# Patient Record
Sex: MALE | Race: WHITE | NOT HISPANIC OR LATINO | Employment: FULL TIME | URBAN - METROPOLITAN AREA
[De-identification: names, ages, dates, MRNs, and addresses within clinical notes are randomized per-mention and may not be internally consistent; named-entity substitution may affect disease eponyms.]

---

## 2021-11-04 ENCOUNTER — TELEPHONE (OUTPATIENT)
Dept: CARDIOLOGY | Facility: MEDICAL CENTER | Age: 46
End: 2021-11-04

## 2021-11-04 NOTE — TELEPHONE ENCOUNTER
Spoke with pt who confirmed they are new patient to cardiology. Per pt has seen other cardiology outside of [facility]. Per pt has not has any recent hospitalizations outside of [facility]. Pt is sharita to see Anneliese CAZARES on 11/05/21 at 0930.   Appointment time, location and date confirmed with pt.    Request records from [facility] for most recent labs at P#[phone] [phone]    Confirmation received. Scanned to [employer]. Pending records.

## 2021-11-05 ENCOUNTER — TELEPHONE (OUTPATIENT)
Dept: CARDIOLOGY | Facility: MEDICAL CENTER | Age: 46
End: 2021-11-05

## 2021-11-05 ENCOUNTER — OFFICE VISIT (OUTPATIENT)
Dept: CARDIOLOGY | Facility: MEDICAL CENTER | Age: 46
End: 2021-11-05
Payer: COMMERCIAL

## 2021-11-05 VITALS
WEIGHT: 215.4 LBS | RESPIRATION RATE: 14 BRPM | OXYGEN SATURATION: 96 % | HEART RATE: 78 BPM | SYSTOLIC BLOOD PRESSURE: 168 MMHG | HEIGHT: 68 IN | BODY MASS INDEX: 32.64 KG/M2 | DIASTOLIC BLOOD PRESSURE: 110 MMHG

## 2021-11-05 DIAGNOSIS — I10 ESSENTIAL HYPERTENSION: ICD-10-CM

## 2021-11-05 DIAGNOSIS — Z82.49 FAMILY HISTORY OF ANEURYSM: ICD-10-CM

## 2021-11-05 PROBLEM — F51.01 PRIMARY INSOMNIA: Status: ACTIVE | Noted: 2018-11-29

## 2021-11-05 PROBLEM — R35.0 INCREASED URINARY FREQUENCY: Status: ACTIVE | Noted: 2021-02-12

## 2021-11-05 PROBLEM — E78.2 MIXED HYPERLIPIDEMIA: Status: ACTIVE | Noted: 2018-06-09

## 2021-11-05 PROBLEM — G47.30 SLEEP APNEA: Status: ACTIVE | Noted: 2021-08-05

## 2021-11-05 LAB — EKG IMPRESSION: NORMAL

## 2021-11-05 PROCEDURE — 93000 ELECTROCARDIOGRAM COMPLETE: CPT | Performed by: INTERNAL MEDICINE

## 2021-11-05 PROCEDURE — 99214 OFFICE O/P EST MOD 30 MIN: CPT | Performed by: NURSE PRACTITIONER

## 2021-11-05 RX ORDER — ZOLPIDEM TARTRATE 5 MG/1
TABLET ORAL
COMMUNITY
Start: 2021-11-04 | End: 2021-11-05

## 2021-11-05 RX ORDER — DILTIAZEM HYDROCHLORIDE 360 MG/1
CAPSULE, EXTENDED RELEASE ORAL
COMMUNITY
Start: 2021-10-30 | End: 2021-11-05

## 2021-11-05 RX ORDER — DILTIAZEM HYDROCHLORIDE 360 MG/1
360 CAPSULE, EXTENDED RELEASE ORAL DAILY
COMMUNITY
Start: 2021-08-05

## 2021-11-05 RX ORDER — ZOLPIDEM TARTRATE 5 MG/1
5 TABLET ORAL NIGHTLY PRN
COMMUNITY
Start: 2021-10-28

## 2021-11-05 RX ORDER — CARVEDILOL 6.25 MG/1
6.25 TABLET ORAL 2 TIMES DAILY WITH MEALS
Qty: 60 TABLET | Refills: 11 | Status: SHIPPED | OUTPATIENT
Start: 2021-11-05 | End: 2021-12-22 | Stop reason: SDUPTHER

## 2021-11-05 RX ORDER — ZOLPIDEM TARTRATE 5 MG/1
TABLET ORAL
COMMUNITY
Start: 2021-10-29 | End: 2021-11-05

## 2021-11-05 ASSESSMENT — ENCOUNTER SYMPTOMS
VOMITING: 0
HEADACHES: 0
COUGH: 0
HEMOPTYSIS: 0
CHILLS: 0
NAUSEA: 0
ORTHOPNEA: 0
FEVER: 0
PALPITATIONS: 0
DIZZINESS: 0
SPUTUM PRODUCTION: 0
SHORTNESS OF BREATH: 0
CLAUDICATION: 0
PND: 0
WHEEZING: 0

## 2021-11-05 NOTE — TELEPHONE ENCOUNTER
Per DB     Records to be requested from [facility].    Faxed over records request form to [phone]

## 2021-11-05 NOTE — PATIENT INSTRUCTIONS
-We now know that lack of exercise is as risky as smoking or having diabetes in terms of your heart health.  Try to perform a moderate intensity exercise which includes brisk walking, biking, swimming at least 150 minutes a week or 30 minutes for 5 days in the week. Start 10 minutes daily on your bike.    Checking Blood Pressure:  -Blood pressure cuff, spend in the $40-65, with good return policy  -It should be automatic, upper arm, measure your arm to get the correct size, probably adult Large  -Put the cuff in place, rest arm on table near height of your heart, sit quietly for 5 min, legs uncrossed, with back support, then take your blood pressure, write it down, keep a log  -Check no more than 1 time day, maybe 4-5 times per week, try different times of day.  -Can bring your cuff to at least one appointment where it can be calibrated to a manual cuff if you are concerned.  -Goal blood pressure is at least under 130/80, ideally under 120/80.  If you think your BP is overall too high, let us know in the office, we can adjust medications, can use [facility] or call the [place] office: [phone].     -Salt=sodium=sea salt, guidelines say stay under 2,500 mg daily but I ask for under 4,000 mg daily.

## 2021-11-05 NOTE — LETTER
[facility]   [address]  [place] [address]  Phone: [phone]  Fax: [phone]              Aishwarya Cadroza  1975    Encounter Date: 11/5/2021    CHAU Mora          PROGRESS NOTE:  Chief Complaint   Patient presents with   • Hypertension     NP Dx: Essential (primary) hypertension       Subjective     Aishwarya Cardoza is a 46 y.o. male who presents today     History reviewed. No pertinent past medical history.  History reviewed. No pertinent surgical history.  History reviewed. No pertinent family history.  Social History     Socioeconomic History   • Marital status:      Spouse name: Not on file   • Number of children: Not on file   • Years of education: Not on file   • Highest education level: Not on file   Occupational History   • Not on file   Tobacco Use   • Smoking status: Former Smoker   • Smokeless tobacco: Former User   Substance and Sexual Activity   • Alcohol use: Yes   • Drug use: Never   • Sexual activity: Not on file   Other Topics Concern   • Not on file   Social History Narrative   • Not on file     Social Determinants of Health     Financial Resource Strain:    • Difficulty of Paying Living Expenses:    Food Insecurity:    • Worried About Running Out of Food in the Last Year:    • Ran Out of Food in the Last Year:    Transportation Needs:    • Lack of Transportation (Medical):    • Lack of Transportation (Non-Medical):    Physical Activity:    • Days of Exercise per Week:    • Minutes of Exercise per Session:    Stress:    • Feeling of Stress :    Social Connections:    • Frequency of Communication with Friends and Family:    • Frequency of Social Gatherings with Friends and Family:    • Attends Zoroastrianism Services:    • Active Member of Clubs or Organizations:    • Attends Club or Organization Meetings:    • Marital Status:    Intimate Partner Violence:    • Fear of Current or Ex-Partner:    •  "Emotionally Abused:    • Physically Abused:    • Sexually Abused:      No Known Allergies  Outpatient Encounter Medications as of 11/5/2021   Medication Sig Dispense Refill   • DILTIAZem CD (CARDIZEM CD) 360 MG CAPSULE SR 24 HR Take 360 mg by mouth every day.     • zolpidem (AMBIEN) 5 MG Tab Take 5 mg by mouth.     • Omega-3 Fatty Acids (FISH OIL PO) Take  by mouth.     • Multiple Vitamin (MULTI-VITAMIN PO) Take  by mouth.     • Red Yeast Rice Extract (RED YEAST RICE PO) Take  by mouth.     • [DISCONTINUED] diltiazem CD (CARDIZEM CD) 360 MG ER capsule  (Patient not taking: Reported on 11/5/2021)     • [DISCONTINUED] zolpidem (AMBIEN) 5 MG Tab  (Patient not taking: Reported on 11/5/2021)     • [DISCONTINUED] DILTIAZEM HCL PO  (Patient not taking: Reported on 11/5/2021)     • [DISCONTINUED] zolpidem (AMBIEN) 5 MG Tab  (Patient not taking: Reported on 11/5/2021)       No facility-administered encounter medications on file as of 11/5/2021.     Review of Systems   Constitutional: Negative for chills and fever.   Respiratory: Negative for cough, hemoptysis, sputum production, shortness of breath and wheezing.    Cardiovascular: Negative for chest pain, palpitations, orthopnea, claudication, leg swelling and PND.   Gastrointestinal: Negative for nausea and vomiting.   Neurological: Negative for dizziness and headaches.   All other systems reviewed and are negative.             Objective     BP (!) 168/110 (BP Location: Left arm, Patient Position: Sitting, BP Cuff Size: Adult)   Pulse 78   Resp 14   Ht 1.727 m (5' 8\")   Wt 97.7 kg (215 lb 6.4 oz)   SpO2 96%   BMI 32.75 kg/m²     Physical Exam  Vitals and nursing note reviewed.   Constitutional:       Appearance: He is well-developed.   Neck:      Vascular: No JVD.   Cardiovascular:      Rate and Rhythm: Normal rate and regular rhythm.      Heart sounds: Normal heart sounds. No murmur heard.     Pulmonary:      Effort: Pulmonary effort is normal.      Breath sounds: " Normal breath sounds.   Abdominal:      Palpations: Abdomen is soft.   Musculoskeletal:      Cervical back: Neck supple.   Skin:     General: Skin is warm and dry.   Neurological:      Comments: CN II-XII grossly intact   Psychiatric:         Behavior: Behavior normal.         Thought Content: Thought content normal.         Judgment: Judgment normal.                Assessment & Plan     1. Essential hypertension  EKG       Medical Decision Making: Today's Assessment/Status/Plan:                              No Recipients

## 2021-12-22 RX ORDER — CARVEDILOL 12.5 MG/1
12.5 TABLET ORAL 2 TIMES DAILY WITH MEALS
Qty: 180 TABLET | Refills: 3 | Status: SHIPPED | OUTPATIENT
Start: 2021-12-22 | End: 2022-03-25 | Stop reason: SDUPTHER

## 2022-02-11 ENCOUNTER — HOSPITAL ENCOUNTER (OUTPATIENT)
Dept: CARDIOLOGY | Facility: MEDICAL CENTER | Age: 47
End: 2022-02-11
Attending: NURSE PRACTITIONER
Payer: COMMERCIAL

## 2022-02-11 DIAGNOSIS — I10 ESSENTIAL HYPERTENSION: ICD-10-CM

## 2022-02-11 LAB
LV EJECT FRACT  99904: 60
LV EJECT FRACT MOD 2C 99903: 61.46
LV EJECT FRACT MOD 4C 99902: 60.18
LV EJECT FRACT MOD BP 99901: 60.56

## 2022-02-11 PROCEDURE — 93306 TTE W/DOPPLER COMPLETE: CPT

## 2022-02-11 PROCEDURE — 93306 TTE W/DOPPLER COMPLETE: CPT | Mod: 26 | Performed by: INTERNAL MEDICINE

## 2022-03-25 ENCOUNTER — OFFICE VISIT (OUTPATIENT)
Dept: CARDIOLOGY | Facility: MEDICAL CENTER | Age: 47
End: 2022-03-25
Payer: COMMERCIAL

## 2022-03-25 VITALS
HEART RATE: 84 BPM | BODY MASS INDEX: 32.74 KG/M2 | WEIGHT: 216 LBS | SYSTOLIC BLOOD PRESSURE: 140 MMHG | DIASTOLIC BLOOD PRESSURE: 82 MMHG | OXYGEN SATURATION: 95 % | RESPIRATION RATE: 16 BRPM | HEIGHT: 68 IN

## 2022-03-25 DIAGNOSIS — Z82.49 FAMILY HISTORY OF ANEURYSM: ICD-10-CM

## 2022-03-25 DIAGNOSIS — I10 ESSENTIAL HYPERTENSION: ICD-10-CM

## 2022-03-25 PROCEDURE — 99214 OFFICE O/P EST MOD 30 MIN: CPT | Performed by: NURSE PRACTITIONER

## 2022-03-25 RX ORDER — LOSARTAN POTASSIUM 25 MG/1
25 TABLET ORAL DAILY
Qty: 30 TABLET | Refills: 11 | Status: SHIPPED | OUTPATIENT
Start: 2022-03-25

## 2022-03-25 RX ORDER — DILTIAZEM HYDROCHLORIDE 360 MG/1
CAPSULE, EXTENDED RELEASE ORAL
COMMUNITY
Start: 2022-01-22 | End: 2022-03-25

## 2022-03-25 RX ORDER — CARVEDILOL 25 MG/1
25 TABLET ORAL 2 TIMES DAILY WITH MEALS
Qty: 180 TABLET | Refills: 3 | Status: SHIPPED | OUTPATIENT
Start: 2022-03-25

## 2022-03-25 ASSESSMENT — ENCOUNTER SYMPTOMS
COUGH: 0
ORTHOPNEA: 0
HEMOPTYSIS: 0
FEVER: 0
SPUTUM PRODUCTION: 0
NAUSEA: 0
PALPITATIONS: 0
HEADACHES: 0
PND: 0
VOMITING: 0
WHEEZING: 0
CHILLS: 0
SHORTNESS OF BREATH: 0
DIZZINESS: 0
CLAUDICATION: 0

## 2022-03-25 NOTE — PROGRESS NOTES
Chief Complaint   Patient presents with   • Hypertension     F/V Dx: Essential hypertension   • Hyperlipidemia     F/V Dx: Mixed hypertension       Subjective     Aishwarya David Cardoza is a 46 y.o. male who presents today for history of his brother having a cardiac aneurysm (brother followed by Dr. Dow) and also for hypertension.      Since 11/5/2021 the patient has had echocardiogram which was normal with EF of 60% and no WMA.  The patient brings in an extensive list of blood pressures and initially when we increased his carvedilol to 25 mg twice daily blood pressures improved a great deal.  Over the last 2 months they have been creeping up into the 130-150 SBP.  He is compliant with carvedilol 25 mg twice daily and diltiazem  mg daily.  Patient denies chest pain, palpitations, orthopnea, and lower extremity edema.    11/5/2021: He is hypertensive today at 168/110.  He was rechecked at the end of our visit and was 163/103.  Patient reports he was told he had a murmur when he was a child.  I could not hear 1 with auscultation today.  Patient denies shortness of breath, lower extremity edema, PND, orthopnea.  Has occasional sharp pain in his chest which happens at rest and with exercise but is very infrequent it lasts for just moments.  Has been on a statin in the past but had urine retention and bladder problems.     ECG today shows sinus rhythm.     No excessive alcohol, 2 drinks per day  No excessive caffeine, 1 cup/day  No drug use  No exercise  Works as a ,   Back surgery in 2008    Past Medical History:   Diagnosis Date   • Hypertension      History reviewed. No pertinent surgical history.  Family History   Problem Relation Age of Onset   • Other Brother      Social History     Socioeconomic History   • Marital status:      Spouse name: Not on file   • Number of children: Not on file   • Years of education: Not on file   • Highest education level: Not on file   Occupational  "History   • Not on file   Tobacco Use   • Smoking status: Former Smoker   • Smokeless tobacco: Former User   Substance and Sexual Activity   • Alcohol use: Yes   • Drug use: Never   • Sexual activity: Not on file   Other Topics Concern   • Not on file   Social History Narrative   • Not on file     Social Determinants of Health     Financial Resource Strain: Not on file   Food Insecurity: Not on file   Transportation Needs: Not on file   Physical Activity: Not on file   Stress: Not on file   Social Connections: Not on file   Intimate Partner Violence: Not on file   Housing Stability: Not on file     No Known Allergies  Outpatient Encounter Medications as of 3/25/2022   Medication Sig Dispense Refill   • carvedilol (COREG) 12.5 MG Tab Take 1 Tablet by mouth 2 times a day with meals. 180 Tablet 3   • DILTIAZem CD (CARDIZEM CD) 360 MG CAPSULE SR 24 HR Take 360 mg by mouth every day.     • zolpidem (AMBIEN) 5 MG Tab Take 5 mg by mouth at bedtime as needed.     • Omega-3 Fatty Acids (FISH OIL PO) Take 1,000 mg by mouth every day.     • Multiple Vitamin (MULTI-VITAMIN PO) Take  by mouth every day.     • Red Yeast Rice Extract (RED YEAST RICE PO) Take 600 mg by mouth every day.     • [DISCONTINUED] diltiazem CD (CARDIZEM CD) 360 MG ER capsule        No facility-administered encounter medications on file as of 3/25/2022.     Review of Systems   Constitutional: Negative for chills and fever.   Respiratory: Negative for cough, hemoptysis, sputum production, shortness of breath and wheezing.    Cardiovascular: Negative for chest pain, palpitations, orthopnea, claudication, leg swelling and PND.   Gastrointestinal: Negative for nausea and vomiting.   Neurological: Negative for dizziness and headaches.   All other systems reviewed and are negative.             Objective     /82 (BP Location: Left arm, Patient Position: Sitting, BP Cuff Size: Adult)   Pulse 84   Resp 16   Ht 1.727 m (5' 8\")   Wt 98 kg (216 lb)   SpO2 " 95%   BMI 32.84 kg/m²     Physical Exam  Vitals and nursing note reviewed.   Constitutional:       Appearance: He is well-developed.   Neck:      Vascular: No JVD.   Cardiovascular:      Rate and Rhythm: Normal rate and regular rhythm.      Heart sounds: Normal heart sounds. No murmur heard.  Pulmonary:      Effort: Pulmonary effort is normal.      Breath sounds: Normal breath sounds.   Abdominal:      Palpations: Abdomen is soft.   Musculoskeletal:      Cervical back: Neck supple.   Skin:     General: Skin is warm and dry.   Neurological:      Comments: CN II-XII grossly intact   Psychiatric:         Behavior: Behavior normal.         Thought Content: Thought content normal.         Judgment: Judgment normal.            Echocardiogram 2/11/2022  CONCLUSIONS   No prior study is available for comparison.   Normal left ventricular systolic function.   No evidence of valvular abnormality based on Doppler evaluation.    Assessment & Plan     1. Essential hypertension     2. Family history of aneurysm         Medical Decision Making: Today's Assessment/Status/Plan:   Continue carvedilol 25 mg twice daily and diltiazem  mg daily  Start losartan 25 mg daily.  Follow-up in 3 months.  Once blood pressure is stable can start following up with primary care.

## 2023-03-08 NOTE — PROGRESS NOTES
Chief Complaint   Patient presents with   • Hypertension     NP Dx: Essential (primary) hypertension       Subjective     Aishwarya Cardoza is a 46 y.o. male who presents today for consultation for history of his brother having a cardiac aneurysm (brother followed by Dr. Dow) and also for hypertension.  He is hypertensive today at 168/110.  He was rechecked at the end of our visit and was 163/103.  Patient reports he was told he had a murmur when he was a child.  I could not hear 1 with auscultation today.  Patient denies shortness of breath, lower extremity edema, PND, orthopnea.  Has occasional sharp pain in his chest which happens at rest and with exercise but is very infrequent it lasts for just moments.  Has been on a statin in the past but had urine retention and bladder problems.    ECG today shows sinus rhythm.    No excessive alcohol, 2 drinks per day  No excessive caffeine, 1 cup/day  No drug use  No exercise  Works as a ,   Back surgery in 2008    Past Medical History:   Diagnosis Date   • Hypertension      History reviewed. No pertinent surgical history.  Family History   Problem Relation Age of Onset   • Other Brother      Social History     Socioeconomic History   • Marital status:      Spouse name: Not on file   • Number of children: Not on file   • Years of education: Not on file   • Highest education level: Not on file   Occupational History   • Not on file   Tobacco Use   • Smoking status: Former Smoker   • Smokeless tobacco: Former User   Substance and Sexual Activity   • Alcohol use: Yes   • Drug use: Never   • Sexual activity: Not on file   Other Topics Concern   • Not on file   Social History Narrative   • Not on file     Social Determinants of Health     Financial Resource Strain:    • Difficulty of Paying Living Expenses:    Food Insecurity:    • Worried About Running Out of Food in the Last Year:    • Ran Out of Food in the Last Year:    Transportation Needs:    •  Lack of Transportation (Medical):    • Lack of Transportation (Non-Medical):    Physical Activity:    • Days of Exercise per Week:    • Minutes of Exercise per Session:    Stress:    • Feeling of Stress :    Social Connections:    • Frequency of Communication with Friends and Family:    • Frequency of Social Gatherings with Friends and Family:    • Attends L[employer] Services:    • Active Member of Clubs or Organizations:    • Attends Club or Organization Meetings:    • Marital Status:    Intimate Partner Violence:    • Fear of Current or Ex-Partner:    • Emotionally Abused:    • Physically Abused:    • Sexually Abused:      No Known Allergies  Outpatient Encounter Medications as of 11/5/2021   Medication Sig Dispense Refill   • DILTIAZem CD (CARDIZEM CD) 360 MG CAPSULE SR 24 HR Take 360 mg by mouth every day.     • zolpidem (AMBIEN) 5 MG Tab Take 5 mg by mouth.     • Omega-3 Fatty Acids (FISH OIL PO) Take  by mouth.     • Multiple Vitamin (MULTI-VITAMIN PO) Take  by mouth.     • Red Yeast Rice Extract (RED YEAST RICE PO) Take  by mouth.     • carvedilol (COREG) 6.25 MG Tab Take 1 Tablet by mouth 2 times a day with meals. 60 Tablet 11   • [DISCONTINUED] diltiazem CD (CARDIZEM CD) 360 MG ER capsule  (Patient not taking: Reported on 11/5/2021)     • [DISCONTINUED] zolpidem (AMBIEN) 5 MG Tab  (Patient not taking: Reported on 11/5/2021)     • [DISCONTINUED] DILTIAZEM HCL PO  (Patient not taking: Reported on 11/5/2021)     • [DISCONTINUED] zolpidem (AMBIEN) 5 MG Tab  (Patient not taking: Reported on 11/5/2021)       No facility-administered encounter medications on file as of 11/5/2021.     Review of Systems   Constitutional: Negative for chills and fever.   Respiratory: Negative for cough, hemoptysis, sputum production, shortness of breath and wheezing.    Cardiovascular: Negative for chest pain, palpitations, orthopnea, claudication, leg swelling and PND.   Gastrointestinal: Negative for nausea and vomiting.  "  Neurological: Negative for dizziness and headaches.   All other systems reviewed and are negative.             Objective     BP (!) 168/110 (BP Location: Left arm, Patient Position: Sitting, BP Cuff Size: Adult)   Pulse 78   Resp 14   Ht 1.727 m (5' 8\")   Wt 97.7 kg (215 lb 6.4 oz)   SpO2 96%   BMI 32.75 kg/m²     Physical Exam  Vitals and nursing note reviewed.   Constitutional:       Appearance: He is well-developed.   Neck:      Vascular: No JVD.   Cardiovascular:      Rate and Rhythm: Normal rate and regular rhythm.      Heart sounds: Normal heart sounds. No murmur heard.     Pulmonary:      Effort: Pulmonary effort is normal.      Breath sounds: Normal breath sounds.   Abdominal:      Palpations: Abdomen is soft.   Musculoskeletal:      Cervical back: Neck supple.   Skin:     General: Skin is warm and dry.   Neurological:      Comments: CN II-XII grossly intact   Psychiatric:         Behavior: Behavior normal.         Thought Content: Thought content normal.         Judgment: Judgment normal.     ECG 11/5/2021, my personal interpretation sinus rhythm           Assessment & Plan     1. Essential hypertension  EKG    EC-ECHOCARDIOGRAM COMPLETE W/O CONT   2. Family history of aneurysm         Medical Decision Making: Today's Assessment/Status/Plan:   We will obtain echocardiogram to see if there is any evidence of an aneurysm, check valves, and check for wall motion abnormalities and like that he had murmur as a child, sharp pain at rest and exertion, and murmur as a child.     Start carvedilol 6.25 mg twice daily.  Discussed the importance of checking blood pressure 2 hours after taking medication in the a.m.  Will MyChart message me with blood pressures in approximately 2 weeks.  He knows he can increase carvedilol to 12.5 mg twice daily if blood pressure remains over 130/80.    Obtain recent lab values from [facility].    Follow-up with me in 3 months          " show yes

## 2024-03-28 ENCOUNTER — OFFICE VISIT (OUTPATIENT)
Dept: CARDIOLOGY | Facility: MEDICAL CENTER | Age: 49
End: 2024-03-28
Attending: NURSE PRACTITIONER
Payer: COMMERCIAL

## 2024-03-28 VITALS
SYSTOLIC BLOOD PRESSURE: 126 MMHG | RESPIRATION RATE: 14 BRPM | OXYGEN SATURATION: 97 % | WEIGHT: 233 LBS | HEIGHT: 68 IN | DIASTOLIC BLOOD PRESSURE: 72 MMHG | HEART RATE: 68 BPM | BODY MASS INDEX: 35.31 KG/M2

## 2024-03-28 DIAGNOSIS — E78.2 MIXED HYPERLIPIDEMIA: Primary | ICD-10-CM

## 2024-03-28 DIAGNOSIS — I10 ESSENTIAL HYPERTENSION: ICD-10-CM

## 2024-03-28 LAB — EKG IMPRESSION: NORMAL

## 2024-03-28 PROCEDURE — 99212 OFFICE O/P EST SF 10 MIN: CPT | Performed by: NURSE PRACTITIONER

## 2024-03-28 PROCEDURE — 93005 ELECTROCARDIOGRAM TRACING: CPT | Performed by: NURSE PRACTITIONER

## 2024-03-28 RX ORDER — ROSUVASTATIN CALCIUM 20 MG/1
20 TABLET, COATED ORAL EVERY EVENING
Qty: 90 TABLET | Refills: 3 | Status: SHIPPED | OUTPATIENT
Start: 2024-03-28

## 2024-03-29 ASSESSMENT — ENCOUNTER SYMPTOMS
PND: 0
DIZZINESS: 0
SENSORY CHANGE: 0
ORTHOPNEA: 0
NEUROLOGICAL NEGATIVE: 1
HALLUCINATIONS: 0
NAUSEA: 0
CONSTITUTIONAL NEGATIVE: 1
EYES NEGATIVE: 1
WHEEZING: 0
PALPITATIONS: 0
DEPRESSION: 0
RESPIRATORY NEGATIVE: 1
CLAUDICATION: 0
GASTROINTESTINAL NEGATIVE: 1
BRUISES/BLEEDS EASILY: 0
NERVOUS/ANXIOUS: 0
SHORTNESS OF BREATH: 0
MUSCULOSKELETAL NEGATIVE: 1

## 2024-03-29 NOTE — PROGRESS NOTES
Cardiology Follow up Note    DOS: 3/29/2024  [id number]  Aishwarya Cardoza    Chief complaint/Reason for consult: annual follow up    HPI: Pt is a 48 y.o. male who presents to the clinic today in follow up for cardiac care . Patient has a past medical history significant for but not limited to: family h/o aneurysm and heart disease, hypertension, mixed hyperlipidemia, sleep apnea. Patient doing well. Increase rosuvastatin to 20mg for better lipid management. Healthy diet, hydration and exercise for lifestyle modification. BP well controlled today. Repeat lipid panel in one month.       Past Medical History:   Diagnosis Date    Hypertension        History reviewed. No pertinent surgical history.    Social History     Socioeconomic History    Marital status:      Spouse name: Not on file    Number of children: Not on file    Years of education: Not on file    Highest education level: Not on file   Occupational History    Not on file   Tobacco Use    Smoking status: Former    Smokeless tobacco: Former   Substance and Sexual Activity    Alcohol use: Yes    Drug use: Never    Sexual activity: Not on file   Other Topics Concern    Not on file   Social History Narrative    Not on file     Social Determinants of Health     Financial Resource Strain: Not on file   Food Insecurity: Not on file   Transportation Needs: Not on file   Physical Activity: Not on file   Stress: Not on file   Social Connections: Not on file   Intimate Partner Violence: Not on file   Housing Stability: Not on file       Family History   Problem Relation Age of Onset    Other Brother        No Known Allergies    Current Outpatient Medications   Medication Sig Dispense Refill    rosuvastatin (CRESTOR) 20 MG Tab Take 1 Tablet by mouth every evening. 90 Tablet 3    carvedilol (COREG) 25 MG Tab Take 1 Tablet by mouth 2 times a day with meals. 180 Tablet 3    losartan (COZAAR) 25 MG Tab Take 1 Tablet by mouth every day. 30 Tablet 11    DILTIAZem CD  (CARDIZEM CD) 360 MG CAPSULE SR 24 HR Take 360 mg by mouth every day.      zolpidem (AMBIEN) 5 MG Tab Take 5 mg by mouth at bedtime as needed.      Omega-3 Fatty Acids (FISH OIL PO) Take 1,000 mg by mouth every day.      Multiple Vitamin (MULTI-VITAMIN PO) Take  by mouth every day.      Red Yeast Rice Extract (RED YEAST RICE PO) Take 600 mg by mouth every day.       No current facility-administered medications for this visit.       Vitals:    03/28/24 1532   BP: 126/72   Pulse: 68   Resp: 14   SpO2: 97%         Review of Systems   Constitutional: Negative.  Negative for malaise/fatigue.   HENT: Negative.     Eyes: Negative.    Respiratory: Negative.  Negative for shortness of breath and wheezing.    Cardiovascular:  Negative for chest pain, palpitations, orthopnea, claudication, leg swelling and PND.   Gastrointestinal: Negative.  Negative for nausea.   Genitourinary: Negative.    Musculoskeletal: Negative.    Skin: Negative.    Neurological: Negative.  Negative for dizziness and sensory change.   Endo/Heme/Allergies: Negative.  Does not bruise/bleed easily.   Psychiatric/Behavioral:  Negative for depression and hallucinations. The patient is not nervous/anxious.           EKG interpreted by me: Sinus jefferson    Physical Exam  Constitutional:       Appearance: Normal appearance.   HENT:      Head: Normocephalic.   Eyes:      Pupils: Pupils are equal, round, and reactive to light.   Neck:      Vascular: No JVD.   Cardiovascular:      Rate and Rhythm: Regular rhythm. Bradycardia present.      Pulses: Normal pulses.      Heart sounds: Normal heart sounds.   Pulmonary:      Effort: Pulmonary effort is normal.      Breath sounds: Normal breath sounds.   Abdominal:      General: Abdomen is flat.      Palpations: Abdomen is soft.   Musculoskeletal:      Cervical back: Normal range of motion.      Right lower leg: No edema.      Left lower leg: No edema.   Skin:     General: Skin is warm and dry.   Neurological:      Mental  "Status: He is alert and oriented to person, place, and time.   Psychiatric:         Mood and Affect: Mood normal.         Behavior: Behavior normal.          Data:  Lipids:   No results found for: \"CHOLSTRLTOT\", \"TRIGLYCERIDE\", \"HDL\", \"LDL\"     BMP:  No results found for: \"SODIUM\", \"POTASSIUM\", \"CHLORIDE\", \"CO2\", \"GLUCOSE\", \"BUN\", \"CREATININE\", \"CALCIUM\", \"ANION\"    GFR:  No results found for: \"IFAFRICA\", \"IFNOTAFR\"     TSH:   No results found for: \"TSHULTRASEN\"    MAGNESIUM:  No results found for: \"MAGNESIUM\"     THYROXINE (T4):   No results found for: \"FREEDIR\"     CBC: No results found for: \"WBC\", \"RBC\", \"HEMOGLOBIN\", \"HEMATOCRIT\", \"MCV\", \"MCH\", \"MCHC\", \"RDW\", \"PLATELETCT\", \"MPV\", \"NEUTSPOLYS\", \"LYMPHOCYTES\", \"MONOCYTES\", \"EOSINOPHILS\", \"BASOPHILS\", \"IMMGRAN\", \"NRBC\", \"NEUTS\", \"LYMPHS\", \"MONOS\", \"EOS\", \"BASO\", \"IMMGRANAB\", \"NRBCAB\"     CBC w/o DIFF  No results found for: \"WBC\", \"RBC\", \"HEMOGLOBIN\", \"MCV\", \"MCH\", \"MCHC\", \"RDW\", \"MPV\"    LIVER:  No results found for: \"ALKPHOSPHAT\", \"ASTSGOT\", \"ALTSGPT\", \"TBILIRUBIN\"    BNP:  No results found for: \"BNPBTYPENAT\"    PT/INR:  No results found for: \"PROTHROMBTM\", \"INR\"          Impression/Plan:  Essential hypertension   - BP well controlled   - goal less than 130/80   - continue carvedilol, losartan, diltiazem   - annual follow up     Mixed hyperlipidemia   - increase rosuvastatin to 20mg daily   - repeat lipid in one month   - goal LDL less than 100       Lifestyle Modification for better heart health care as well as beneficial for prevention of worsening cardiac disease. Lifestyle modification discussed includes diet and reduction of sodium. Patients should eat more of a Mediterranean diet and be very careful with how much processed and fast food they eat. Excessive sodium intake can result in fluid retention which can add additional strain to patients cardiac system. Weight loss can also help long term with cardiac health. For patients with BMI above 25kg/m2, studies " have shown a greater chance of progression of disease as well as recurrence after interventions. Smoking and vaping should be stopped. Moderation on caffeine and alcohol. Excessive alcohol or caffeine can result in reactions and antagonize the heart system. Patient that fit the matrix for sleep apnea should be referred for a formal study and should try to be compliant with treatment for sleep apnea. Stay hydrated and stay active. Studies have shown that staying physically active can help heart health. Exercise goals should be trying to maintain 120-200 minutes per week of exercise.      A total of 30 minutes of time was spent on day of encounter reviewing medical record, performing history and examination, counseling, ordering medication/test/consults, collaborating with referring service, and documentation.    Luke Fishman AGACNP-EP  Cardiac Electrophysiology

## 2024-03-30 NOTE — ASSESSMENT & PLAN NOTE
- BP well controlled   - goal less than 130/80   - continue carvedilol, losartan, diltiazem   - annual follow up

## 2024-05-09 LAB
CHOLEST SERPL-MCNC: 125 MG/DL
HDLC SERPL-MCNC: 39 MG/DL
LDLC SERPL CALC-MCNC: 63 MG/DL
TRIGL SERPL-MCNC: 132 MG/DL

## 2024-05-13 DIAGNOSIS — E78.2 MIXED HYPERLIPIDEMIA: ICD-10-CM

## 2024-09-26 ENCOUNTER — TELEPHONE (OUTPATIENT)
Dept: CARDIOLOGY | Facility: MEDICAL CENTER | Age: 49
End: 2024-09-26
Payer: COMMERCIAL

## 2024-09-26 NOTE — TELEPHONE ENCOUNTER
MT    Caller: [facility]    Topic/issue: Nafisa is calling to notify about Aishwarya Cardoza  going to the outpatient clinic for blood pressure monitoring. Patients heart rate is dropping to 30's and 40's while resting awake. Patient is not symptomatic, but becomes light headed when standing. EKG done at facility 9/26/24, results are being faxed over.    [facility] discontinued Carvedilol 25 MG and Increased Losartan to 50 MG    Facility is asking to contact the patient.    [facility] [phone]    Callback Number: [phone]     Thank You,  Morena CARBONE

## 2024-09-26 NOTE — TELEPHONE ENCOUNTER
Would need to be seen as the patient is on high dose carvedilol for BP and that would need to be adjusted down or stopped due to bradycardia that could cause corresponding hypertension

## 2024-09-26 NOTE — TELEPHONE ENCOUNTER
Phone Number Called: [phone]    Call outcome: Spoke to patient regarding message below.    Message: Called to discuss symptoms and concerns with patient.   Reporting watch is showing bradycardia. Sometimes light headed when standing up.    HR seems to be lower only when sitting down or relaxing.   [facility] [place] they discontinued coreg and increased losartan 50mg.   Reporting going back to see PCP on the 10th.     Discussed with patient I will reach out to MT for any further recommendations at this time. This RN contacted [facility] to request EKG be sent to out office as well.

## 2024-09-26 NOTE — TELEPHONE ENCOUNTER
S/w patient regarding f/u appt. Discussed MT recommendations of seeing patient in clinic for a follow up appt to assess jefferson episodes. Patient also follows up with PCP on the 10/10/24.   Patient scheduled with MT 10/24/24 @ 12:45pm.   Patient verbalized understanding.

## 2024-10-24 ENCOUNTER — NON-PROVIDER VISIT (OUTPATIENT)
Dept: CARDIOLOGY | Facility: MEDICAL CENTER | Age: 49
End: 2024-10-24
Attending: NURSE PRACTITIONER
Payer: COMMERCIAL

## 2024-10-24 VITALS
HEIGHT: 68 IN | RESPIRATION RATE: 14 BRPM | BODY MASS INDEX: 35.25 KG/M2 | WEIGHT: 232.6 LBS | DIASTOLIC BLOOD PRESSURE: 72 MMHG | SYSTOLIC BLOOD PRESSURE: 126 MMHG | HEART RATE: 83 BPM | OXYGEN SATURATION: 97 %

## 2024-10-24 DIAGNOSIS — E78.2 MIXED HYPERLIPIDEMIA: ICD-10-CM

## 2024-10-24 DIAGNOSIS — I49.3 PVC (PREMATURE VENTRICULAR CONTRACTION): ICD-10-CM

## 2024-10-24 DIAGNOSIS — I10 ESSENTIAL HYPERTENSION: ICD-10-CM

## 2024-10-24 LAB — EKG IMPRESSION: NORMAL

## 2024-10-24 PROCEDURE — 93005 ELECTROCARDIOGRAM TRACING: CPT | Performed by: NURSE PRACTITIONER

## 2024-10-24 PROCEDURE — 99212 OFFICE O/P EST SF 10 MIN: CPT | Performed by: NURSE PRACTITIONER

## 2024-10-24 PROCEDURE — 99214 OFFICE O/P EST MOD 30 MIN: CPT | Performed by: NURSE PRACTITIONER

## 2024-10-24 PROCEDURE — 3078F DIAST BP <80 MM HG: CPT | Performed by: NURSE PRACTITIONER

## 2024-10-24 PROCEDURE — 3074F SYST BP LT 130 MM HG: CPT | Performed by: NURSE PRACTITIONER

## 2024-10-24 PROCEDURE — 93010 ELECTROCARDIOGRAM REPORT: CPT | Performed by: STUDENT IN AN ORGANIZED HEALTH CARE EDUCATION/TRAINING PROGRAM

## 2024-10-24 RX ORDER — LOSARTAN POTASSIUM 50 MG/1
50 TABLET ORAL DAILY
Qty: 90 TABLET | Refills: 3 | Status: SHIPPED | OUTPATIENT
Start: 2024-10-24

## 2024-10-24 ASSESSMENT — ENCOUNTER SYMPTOMS
MUSCULOSKELETAL NEGATIVE: 1
NEUROLOGICAL NEGATIVE: 1
PALPITATIONS: 0
HALLUCINATIONS: 0
SHORTNESS OF BREATH: 0
BRUISES/BLEEDS EASILY: 0
GASTROINTESTINAL NEGATIVE: 1
CLAUDICATION: 0
NERVOUS/ANXIOUS: 0
RESPIRATORY NEGATIVE: 1
DIZZINESS: 0
PND: 0
SENSORY CHANGE: 0
DEPRESSION: 0
CONSTITUTIONAL NEGATIVE: 1
NAUSEA: 0
EYES NEGATIVE: 1
ORTHOPNEA: 0
WHEEZING: 0

## 2024-11-13 ENCOUNTER — TELEPHONE (OUTPATIENT)
Dept: CARDIOLOGY | Facility: MEDICAL CENTER | Age: 49
End: 2024-11-13
Payer: COMMERCIAL

## 2024-11-13 NOTE — TELEPHONE ENCOUNTER
FINA EOS to MT's nurse, Nora, on 11/13/2024    Preliminary findings:    1 episode of -185 with an avg rate of 158 bpm    Sinus rhythm  with an avg rate of 75 bpm    Rare isolated SVE(s)    4.8% isolated VE(s), rare couplets and triplets present. Bigeminy and trigeminy were also present    No patient events recorded

## 2024-11-27 ENCOUNTER — TELEPHONE (OUTPATIENT)
Dept: CARDIOLOGY | Facility: MEDICAL CENTER | Age: 49
End: 2024-11-27
Payer: COMMERCIAL

## 2024-11-27 DIAGNOSIS — I49.3 PVC (PREMATURE VENTRICULAR CONTRACTION): ICD-10-CM

## 2024-11-27 NOTE — TELEPHONE ENCOUNTER
FINA EOS to MT's nurse, Ran, on 11/27/2024    Preliminary findings:    1 episode of -117 with an avg rate of 114 bpm    Sinus rhythm  with an avg rate of 77 bpm    Wenckebach was present    Rare isolated supraventricular ectopy present with no noted couplets or triplets    4.9% isolated ventricular ectopy and rare VE couplets present, ventricular bigeminy and trigeminy noted during monitoring    1 recorded patient event associated with:  SR 95  VE(s)

## 2024-12-06 ENCOUNTER — OFFICE VISIT (OUTPATIENT)
Dept: CARDIOLOGY | Facility: MEDICAL CENTER | Age: 49
End: 2024-12-06
Attending: NURSE PRACTITIONER
Payer: COMMERCIAL

## 2024-12-06 VITALS
BODY MASS INDEX: 35.28 KG/M2 | OXYGEN SATURATION: 97 % | HEART RATE: 85 BPM | SYSTOLIC BLOOD PRESSURE: 140 MMHG | DIASTOLIC BLOOD PRESSURE: 82 MMHG | RESPIRATION RATE: 14 BRPM | WEIGHT: 232.8 LBS | HEIGHT: 68 IN

## 2024-12-06 DIAGNOSIS — I10 ESSENTIAL HYPERTENSION: ICD-10-CM

## 2024-12-06 DIAGNOSIS — G47.30 SLEEP APNEA, UNSPECIFIED TYPE: ICD-10-CM

## 2024-12-06 DIAGNOSIS — I49.3 PVC (PREMATURE VENTRICULAR CONTRACTION): ICD-10-CM

## 2024-12-06 LAB — EKG IMPRESSION: NORMAL

## 2024-12-06 PROCEDURE — 3077F SYST BP >= 140 MM HG: CPT | Performed by: NURSE PRACTITIONER

## 2024-12-06 PROCEDURE — 99212 OFFICE O/P EST SF 10 MIN: CPT | Performed by: NURSE PRACTITIONER

## 2024-12-06 PROCEDURE — 3079F DIAST BP 80-89 MM HG: CPT | Performed by: NURSE PRACTITIONER

## 2024-12-06 PROCEDURE — 93005 ELECTROCARDIOGRAM TRACING: CPT | Mod: TC | Performed by: NURSE PRACTITIONER

## 2024-12-06 PROCEDURE — 99214 OFFICE O/P EST MOD 30 MIN: CPT | Performed by: NURSE PRACTITIONER

## 2024-12-06 RX ORDER — LOSARTAN POTASSIUM 100 MG/1
100 TABLET ORAL DAILY
Qty: 90 TABLET | Refills: 3 | Status: SHIPPED | OUTPATIENT
Start: 2024-12-06

## 2024-12-06 ASSESSMENT — ENCOUNTER SYMPTOMS
PALPITATIONS: 0
SENSORY CHANGE: 0
CLAUDICATION: 0
RESPIRATORY NEGATIVE: 1
CONSTITUTIONAL NEGATIVE: 1
PND: 0
DIZZINESS: 0
NEUROLOGICAL NEGATIVE: 1
EYES NEGATIVE: 1
WHEEZING: 0
NERVOUS/ANXIOUS: 0
MUSCULOSKELETAL NEGATIVE: 1
HALLUCINATIONS: 0
SHORTNESS OF BREATH: 0
GASTROINTESTINAL NEGATIVE: 1
BRUISES/BLEEDS EASILY: 0
NAUSEA: 0
DEPRESSION: 0
ORTHOPNEA: 0

## 2024-12-06 NOTE — PROGRESS NOTES
Cardiology Follow up Note    DOS: 12/6/2024   [id number]  Aishwarya Cardoza    Chief complaint/Reason for consult: post monitor    HPI: Pt is a 48 y.o. male who presents to the clinic today in follow up for cardiac care . Patient has a past medical history significant for but not limited to: family h/o aneurysm and heart disease, hypertension, mixed hyperlipidemia, sleep apnea. Patient doing well. Ectopy has reduced. Overall 5% PVC burden on monitor. Lower episode count of bigeminy. Blood pressure elevated. Increase losartan. Getting sleep tested again for JAZMIN. Otherwise doing well. Annual visits.     Past Medical History:   Diagnosis Date    Hypertension        No past surgical history on file.    Social History     Socioeconomic History    Marital status:      Spouse name: Not on file    Number of children: Not on file    Years of education: Not on file    Highest education level: Not on file   Occupational History    Not on file   Tobacco Use    Smoking status: Former    Smokeless tobacco: Former   Substance and Sexual Activity    Alcohol use: Yes    Drug use: Never    Sexual activity: Not on file   Other Topics Concern    Not on file   Social History Narrative    Not on file     Social Drivers of Health     Financial Resource Strain: Not on file   Food Insecurity: Not on file   Transportation Needs: Not on file   Physical Activity: Not on file   Stress: Not on file (11/3/2024)   Social Connections: Not on file   Intimate Partner Violence: Low Risk  (4/12/2021)    [facility]    Intimate Partner Violence     Insults You: Not on file     Threatens You: Not on file     Screams at You: Not on file     Physically Hurt: Not on file     Intimate Partner Violence Score: Not on file   Housing Stability: Not on file       Family History   Problem Relation Age of Onset    Other Brother        No Known Allergies    Current Outpatient Medications   Medication Sig Dispense Refill     losartan (COZAAR) 100 MG Tab Take 1 Tablet by mouth every day. 90 Tablet 3    rosuvastatin (CRESTOR) 20 MG Tab Take 1 Tablet by mouth every evening. 90 Tablet 3    DILTIAZem CD (CARDIZEM CD) 360 MG CAPSULE SR 24 HR Take 360 mg by mouth every day.      zolpidem (AMBIEN) 5 MG Tab Take 5 mg by mouth at bedtime as needed.      Omega-3 Fatty Acids (FISH OIL PO) Take 1,000 mg by mouth every day.      Multiple Vitamin (MULTI-VITAMIN PO) Take  by mouth every day.      Red Yeast Rice Extract (RED YEAST RICE PO) Take 600 mg by mouth every day.       No current facility-administered medications for this visit.       Vitals:    12/06/24 1055   BP: (!) 140/82   Pulse: 85   Resp: 14   SpO2: 97%           Review of Systems   Constitutional: Negative.  Negative for malaise/fatigue.   HENT: Negative.     Eyes: Negative.    Respiratory: Negative.  Negative for shortness of breath and wheezing.    Cardiovascular:  Negative for chest pain, palpitations, orthopnea, claudication, leg swelling and PND.   Gastrointestinal: Negative.  Negative for nausea.   Genitourinary: Negative.    Musculoskeletal: Negative.    Skin: Negative.    Neurological: Negative.  Negative for dizziness and sensory change.   Endo/Heme/Allergies: Negative.  Does not bruise/bleed easily.   Psychiatric/Behavioral:  Negative for depression and hallucinations. The patient is not nervous/anxious.           EKG interpreted by me: Sinus     Physical Exam  Constitutional:       Appearance: Normal appearance.   HENT:      Head: Normocephalic.   Eyes:      Pupils: Pupils are equal, round, and reactive to light.   Neck:      Vascular: No JVD.   Cardiovascular:      Rate and Rhythm: Normal rate and regular rhythm.      Pulses: Normal pulses.      Heart sounds: Normal heart sounds.   Pulmonary:      Effort: Pulmonary effort is normal.      Breath sounds: Normal breath sounds.   Abdominal:      General: Abdomen is flat.      Palpations: Abdomen is soft.   Musculoskeletal:       "Cervical back: Normal range of motion.      Right lower leg: No edema.      Left lower leg: No edema.   Skin:     General: Skin is warm and dry.   Neurological:      Mental Status: He is alert and oriented to person, place, and time.   Psychiatric:         Mood and Affect: Mood normal.         Behavior: Behavior normal.          Data:  Lipids:   Lab Results   Component Value Date/Time    CHOLSTRLTOT 125 05/09/2024 12:00 AM    TRIGLYCERIDE 132 05/09/2024 12:00 AM    HDL 39 05/09/2024 12:00 AM    LDL 63 05/09/2024 12:00 AM        BMP:  No results found for: \"SODIUM\", \"POTASSIUM\", \"CHLORIDE\", \"CO2\", \"GLUCOSE\", \"BUN\", \"CREATININE\", \"CALCIUM\", \"ANION\"    GFR:  No results found for: \"IFAFRICA\", \"IFNOTAFR\"     TSH:   No results found for: \"TSHULTRASEN\"    MAGNESIUM:  No results found for: \"MAGNESIUM\"     THYROXINE (T4):   No results found for: \"FREEDIR\"     CBC: No results found for: \"WBC\", \"RBC\", \"HEMOGLOBIN\", \"HEMATOCRIT\", \"MCV\", \"MCH\", \"MCHC\", \"RDW\", \"PLATELETCT\", \"MPV\", \"NEUTSPOLYS\", \"LYMPHOCYTES\", \"MONOCYTES\", \"EOSINOPHILS\", \"BASOPHILS\", \"IMMGRAN\", \"NRBC\", \"NEUTS\", \"LYMPHS\", \"MONOS\", \"EOS\", \"BASO\", \"IMMGRANAB\", \"NRBCAB\"     CBC w/o DIFF  No results found for: \"WBC\", \"RBC\", \"HEMOGLOBIN\", \"MCV\", \"MCH\", \"MCHC\", \"RDW\", \"MPV\"    LIVER:  No results found for: \"ALKPHOSPHAT\", \"ASTSGOT\", \"ALTSGPT\", \"TBILIRUBIN\"    BNP:  No results found for: \"BNPBTYPENAT\"    PT/INR:  No results found for: \"PROTHROMBTM\", \"INR\"          Impression/Plan:  Essential hypertension   - increase losartan 100mg daily   - annual visits   - continue diltiazem dose     Sleep apnea   - getting retested     PVC (premature ventricular contraction)   - 5% burden   - asymptomatic              Luke Fishman AGACNP-EP  Cardiac Electrophysiology    "

## 2025-03-06 DIAGNOSIS — E78.2 MIXED HYPERLIPIDEMIA: ICD-10-CM

## 2025-03-06 RX ORDER — ROSUVASTATIN CALCIUM 20 MG/1
20 TABLET, COATED ORAL EVERY EVENING
Qty: 90 TABLET | Refills: 3 | Status: SHIPPED | OUTPATIENT
Start: 2025-03-06

## 2025-03-06 NOTE — TELEPHONE ENCOUNTER
Is the patient due for a refill? Yes    Was the patient seen the last 15 months? Yes    Date of last office visit: 12/6/2024    Does the patient have an upcoming appointment?  Yes   If yes, When? 12/5/2025    Provider to refill:MT    Does the patient have care home Plus and need 100-day supply? (This applies to ALL medications) Patient does not have SCP

## 2025-03-19 LAB — EKG IMPRESSION: NORMAL
